# Patient Record
Sex: FEMALE | Race: WHITE | NOT HISPANIC OR LATINO | URBAN - NONMETROPOLITAN AREA
[De-identification: names, ages, dates, MRNs, and addresses within clinical notes are randomized per-mention and may not be internally consistent; named-entity substitution may affect disease eponyms.]

---

## 2017-03-16 PROBLEM — H26.493: Noted: 2017-10-16

## 2017-03-16 PROBLEM — H52.4: Noted: 2017-10-16

## 2017-03-16 PROBLEM — H40.1131: Noted: 2017-03-16

## 2017-03-16 PROBLEM — H02.834: Noted: 2017-10-16

## 2017-03-16 PROBLEM — H02.831: Noted: 2017-10-16

## 2019-05-30 ENCOUNTER — IMPORTED ENCOUNTER (OUTPATIENT)
Dept: URBAN - NONMETROPOLITAN AREA CLINIC 1 | Facility: CLINIC | Age: 69
End: 2019-05-30

## 2019-05-30 PROCEDURE — 92014 COMPRE OPH EXAM EST PT 1/>: CPT

## 2019-05-30 PROCEDURE — 92083 EXTENDED VISUAL FIELD XM: CPT

## 2019-05-30 NOTE — PATIENT DISCUSSION
POAG OU- Discussed diagnosis in detail with patient today- OCT done today showed no NFL thinning OU stable from previous. -  VF done today:  OD reliable with borderline non-specific defects better than previous. OS reliable with borderlin inferior nasal step stable from previous. - PACH done in the past shows  - Gonio done in the past shows Grade IV with light pigment OU- IOP stable at 10 OD and 11 OS- Cup to Disc at 0.65 OU - Hx of iStent at time of cataracts in place on exam today. - Continue without medication drops at this time- Continue to monitor every 6 months no less. P/C IOL (LRI) OU with PCO - Discussed findings in detail w/ pt today- Both intraocular lenses are stable and in place- PCO noted OU but stable and no treatment needed at this time - MR done today per pt request new GLRx given. She is interested in purchasing sunglasses.  - Monitor yearly or PRN Dermatochalasis OU- Discussed findings in detail w/ pt- No superior field of vision complaint noted- Monitor PRN; Dr's Notes: patient had developed periorbital pigmentation secondary to xalatan useMR  5/30/19 AOA OUDFE  5/30/19OCT disc  11/26/18OptosVF 5/30/19GonioPACH 3/16/17

## 2019-12-05 ENCOUNTER — IMPORTED ENCOUNTER (OUTPATIENT)
Dept: URBAN - NONMETROPOLITAN AREA CLINIC 1 | Facility: CLINIC | Age: 69
End: 2019-12-05

## 2019-12-05 PROCEDURE — 92014 COMPRE OPH EXAM EST PT 1/>: CPT

## 2019-12-05 PROCEDURE — 92250 FUNDUS PHOTOGRAPHY W/I&R: CPT

## 2019-12-05 NOTE — PATIENT DISCUSSION
POAG OU- Discussed diagnosis in detail with patient today- OCT done previously showed no NFL thinning OU stable from previous. -  VF done previously:  OD reliable with borderline non-specific defects better than previous. OS reliable with borderlin inferior nasal step stable from previous. - PACH done in the past shows  - Gonio done in the past shows Grade IV with light pigment OU- Optos done today cupping stable. - IOP stable at 12 OD and 12 OS- Cup to Disc at 0.65 OU - Hx of iStent at time of cataracts in place on exam today. - Continue without medication drops at this time- Continue to monitor every 6 months no less. P/C IOL (LRI) OU with PCO - Discussed findings in detail w/ pt today- Both intraocular lenses are stable and in place- PCO noted OU but stable and no treatment needed at this time - MR done previously per pt request new GLRx given. She is interested in purchasing sunglasses.  - Monitor yearly or PRN Dermatochalasis OU- Discussed findings in detail w/ pt- No superior field of vision complaint noted- Monitor PRN RIO OU mild-  discussed findings w/ pt today-  recommended baby shampoo eyelid scrubs daily-  monitor PRN; Dr's Notes: patient had developed periorbital pigmentation secondary to xalatan useMR  5/30/19 AOA OUDFE  5/30/19OCT disc  11/26/18Optos 12/5/19VF 5/30/19Gonio - deferred by TK 12/5/19PACH 3/16/17

## 2020-06-22 ENCOUNTER — IMPORTED ENCOUNTER (OUTPATIENT)
Dept: URBAN - NONMETROPOLITAN AREA CLINIC 1 | Facility: CLINIC | Age: 70
End: 2020-06-22

## 2020-06-22 PROCEDURE — 92014 COMPRE OPH EXAM EST PT 1/>: CPT

## 2020-06-22 PROCEDURE — 92133 CPTRZD OPH DX IMG PST SGM ON: CPT

## 2020-06-22 PROCEDURE — 92083 EXTENDED VISUAL FIELD XM: CPT

## 2020-06-22 NOTE — PATIENT DISCUSSION
POAG OU- Discussed diagnosis in detail with patient today- OCT done today OD shows no NFL thinning and OS shows Borderline Superior NFL thinning -  VF done today OD shows Superior Nasal Defect and OS shows Inferior Arcuate slight progression. OS shows slight progression- PACH done previously shows  - Gonio done previously shows Grade IV with light pigment OU- Optos done previously cupping stable. - IOP stable 13 OU- Cup to Disc at 0.65 OU - Hx of iStent at time of cataracts in place on exam today.  Jose Latisha into great detail with patient about re-starting glaucoma drop patient would like to wait until next follow up appointment to see if there is changes in the re-peat tesing - Continue to monitor - RTC 6 months F/U with re-peat Vf and OCT P/C IOL (LRI) OU with PCO - Discussed findings in detail w/ pt today- Both intraocular lenses are stable and in place- PCO noted OU but stable and no treatment needed at this time - Continue to monitor Dermatochalasis OU- Discussed findings in detail w/ pt- No superior field of vision complaint noted- Continue to monitor RIO OU mild- Discussed diagnosis in detail with patient- Discussed signs and symptoms of progression- Recommend patient drinking plenty of water and starting Omega 3’s - Recommend Refresh or Systane  throughout the day- Consider Restasis or plugs in the future if no improvement- Continue to monitor; 's Notes: patient had developed periorbital pigmentation secondary to xalatan useMR  5/30/19 AOA OUDFE  5/30/19OCT disc 6/22/20Optos 12/5/19VF 6/22/20Gonio - deferred by TK 12/5/19PACH 3/16/17

## 2020-09-15 NOTE — PATIENT DISCUSSION
New Prescription: brimonidine (brimonidine): drops: 0.2% 1 drop once a day as directed into both eyes 09-

## 2020-09-15 NOTE — PATIENT DISCUSSION
New Prescription: timolol maleate (timolol maleate): drops: 0.5% 1 drop once a day as directed into both eyes 09-

## 2020-12-17 ENCOUNTER — IMPORTED ENCOUNTER (OUTPATIENT)
Dept: URBAN - NONMETROPOLITAN AREA CLINIC 1 | Facility: CLINIC | Age: 70
End: 2020-12-17

## 2020-12-17 PROCEDURE — 92083 EXTENDED VISUAL FIELD XM: CPT

## 2020-12-17 PROCEDURE — 92133 CPTRZD OPH DX IMG PST SGM ON: CPT

## 2020-12-17 PROCEDURE — 92014 COMPRE OPH EXAM EST PT 1/>: CPT

## 2020-12-17 NOTE — PATIENT DISCUSSION
POAG OU- Discussed diagnosis in detail with patient today- OCT done todayOD shows no NFL thinning and OS shows Borderline Inferior NFL thinning -  VF done today Od shows Fair field with Borderline Superior Arcuate and OS shows Good field with Enlarged Blind Spot - PACH done previously shows  - Gonio done previously shows Grade IV with light pigment OU- Optos done previously cupping stable. - IOP stable 11 OU- Cup to Disc at 0.65 OU - Hx of iStent at time of cataracts in place on exam today.  Sybil Magdaleno into great detail with patient about re-starting glaucoma drop patient would like to wait until next follow up appointment to see if there is changes in the re-peat tesing - Continue to monitor - RTC 6 months F/U with optos and CompleteP/C IOL (LRI) OU with PCO - Discussed findings in detail w/ pt today- Both intraocular lenses are stable and in place- PCO noted OU but stable and no treatment needed at this time - Continue to monitor Dermatochalasis OU- Discussed findings in detail w/ pt- No superior field of vision complaint noted- Continue to monitor RIO OU mild- Discussed diagnosis in detail with patient- Discussed signs and symptoms of progression- Recommend patient drinking plenty of water and starting Omega 3’s - Recommend Refresh or Systane  throughout the day- Consider Restasis or plugs in the future if no improvement- Continue to monitor; 's Notes: patient had developed periorbital pigmentation secondary to xalatan useMR  5/30/19 AOA OUDFE  5/30/19OCT disc 12/17/20Optos 12/5/19VF 12/17/20Gonio - deferred by TK 12/5/19PACH 3/16/17

## 2021-06-21 ENCOUNTER — IMPORTED ENCOUNTER (OUTPATIENT)
Dept: URBAN - NONMETROPOLITAN AREA CLINIC 1 | Facility: CLINIC | Age: 71
End: 2021-06-21

## 2021-06-21 PROBLEM — H40.1131: Noted: 2021-06-21

## 2021-06-21 PROBLEM — Z96.1: Noted: 2021-06-21

## 2021-06-21 PROBLEM — H26.493: Noted: 2021-06-21

## 2021-06-21 PROBLEM — H52.4: Noted: 2021-06-21

## 2021-06-21 PROBLEM — H02.834: Noted: 2021-06-21

## 2021-06-21 PROBLEM — H02.831: Noted: 2021-06-21

## 2021-06-21 PROCEDURE — 92015 DETERMINE REFRACTIVE STATE: CPT

## 2021-06-21 PROCEDURE — 92014 COMPRE OPH EXAM EST PT 1/>: CPT

## 2021-06-21 NOTE — PATIENT DISCUSSION
Myopia OS / Astigmatism OD / Hyperopia OD / Presbyopia OU - Discussed diagnosis in detail with patient- New Glasses RX given today- Continue to monitorPOAG OU- Discussed diagnosis in detail with patient today- OCT done  previously OD shows no NFL thinning and OS shows Borderline Inferior NFL thinning -  VF done previously OD shows Fair field with Borderline Superior Arcuate and OS shows Good field with Enlarged Blind Spot - PACH done previously shows  - Gonio done previously shows Grade IV with light pigment OU- Optos done previously cupping stable. - IOP OD 10 andOS 09- Cup to Disc at 0.65 OU - Hx of iStent at time of cataracts in place on exam today.  Radha Mulling into great detail with patient about re-starting glaucoma drop patient would like to wait until next follow up appointment to see if there is changes in the re-peat tesing - Continue to monitor - RTC 6 months F/U with VF and OCTP/C IOL (LRI) OU with PCO - Discussed findings in detail w/ pt today- PCO noted OU but stable and no treatment needed at this time - Continue to monitor Dermatochalasis OU- Discussed findings in detail w/ pt- No superior field of vision complaint noted- Continue to monitor RIO OU mild- Discussed diagnosis in detail with patient- Discussed signs and symptoms of progression- Recommend patient drinking plenty of water and starting Omega 3’s - Recommend Refresh or Systane  throughout the day- Consider Restasis or plugs in the future if no improvement- Continue to monitor; 's Notes: patient had developed periorbital pigmentation secondary to xalatan useMR  5/30/19 AOA OUDFE  5/30/19OCT disc 12/17/20Optos 12/5/19VF 12/17/20Gonio - deferred by TK 12/5/19PACH 3/16/17

## 2021-12-27 ENCOUNTER — IMPORTED ENCOUNTER (OUTPATIENT)
Dept: URBAN - NONMETROPOLITAN AREA CLINIC 1 | Facility: CLINIC | Age: 71
End: 2021-12-27

## 2021-12-27 PROBLEM — H40.1131: Noted: 2021-12-27

## 2021-12-27 PROBLEM — Z96.1: Noted: 2021-12-27

## 2021-12-27 PROBLEM — H26.493: Noted: 2021-12-27

## 2021-12-27 PROBLEM — H02.834: Noted: 2021-12-27

## 2021-12-27 PROBLEM — H02.831: Noted: 2021-12-27

## 2021-12-27 PROCEDURE — 92083 EXTENDED VISUAL FIELD XM: CPT

## 2021-12-27 PROCEDURE — 92133 CPTRZD OPH DX IMG PST SGM ON: CPT

## 2021-12-27 PROCEDURE — 99213 OFFICE O/P EST LOW 20 MIN: CPT

## 2021-12-27 NOTE — PATIENT DISCUSSION
Myopia OS / Astigmatism OD / Hyperopia OD / Presbyopia OU - Discussed diagnosis in detail with patient- Continue to monitorPOAG OU- Discussed diagnosis in detail with patient today- OCT done today OU shows No NFL thnning  -  VF done today OD shows Good field and Normal and OS shows Good field with [place] - PACH done previously shows  - Gonio done previously shows Grade IV with light pigment OU- Optos done previously cupping stable. - IOP OD 10 andOS 10- Cup to Disc at 0.65 OU - Hx of iStent at time of cataracts in place on exam today.  - Continue to monitor - RTC 6 months routine with DFE P/C IOL (LRI) OU with PCO - Discussed findings in detail w/ pt today- PCO noted OU but stable and no treatment needed at this time - Continue to monitor Dermatochalasis OU- Discussed findings in detail w/ pt- No superior field of vision complaint noted- Continue to monitor RIO OU mild- Discussed diagnosis in detail with patient- Discussed signs and symptoms of progression- Recommend patient drinking plenty of water and starting Omega 3’s - Recommend Refresh or Systane  throughout the day- Consider Restasis or plugs in the future if no improvement- Continue to monitor; Dr's Notes: patient had developed periorbital pigmentation secondary to xalatan useMR  5/30/19 AOA OUDFE  5/30/19OCT disc 12/27/21Optos 12/5/19VF 12/27/21Gonio - deferred by TK 12/5/19PACH 3/16/17

## 2022-04-16 ASSESSMENT — PACHYMETRY
OD_CT_UM: 541; ADJ: THIN
OS_CT_UM: 543; ADJ: THIN
OD_CT_UM: 541; ADJ: THIN
OS_CT_UM: 543; ADJ: THIN
OS_CT_UM: 543; ADJ: THIN
OD_CT_UM: 541; ADJ: THIN
OS_CT_UM: 543; ADJ: THIN
OS_CT_UM: 543; ADJ: THIN
OD_CT_UM: 541; ADJ: THIN
OS_CT_UM: 543; ADJ: THIN

## 2022-04-16 ASSESSMENT — VISUAL ACUITY
OS_CC: 20/20
OD_GLARE: 20/40-
OD_CC: 20/20-
OD_CC: 20/25+
OS_CC: 20/20-
OS_GLARE: 20/40
OS_CC: J1+
OD_CC: 20/22+
OD_CC: J1
OD_CC: 20/22
OD_SC: 20/20
OD_SC: 20/20-2
OS_SC: 20/25-2
OS_CC: 20/20-
OS_CC: 20/20
OS_SC: 20/20

## 2022-04-16 ASSESSMENT — TONOMETRY
OD_IOP_MMHG: 12
OS_IOP_MMHG: 09
OD_IOP_MMHG: 11
OS_IOP_MMHG: 10
OS_IOP_MMHG: 11
OS_IOP_MMHG: 12
OS_IOP_MMHG: 13
OS_IOP_MMHG: 11
OD_IOP_MMHG: 10
OD_IOP_MMHG: 10
OD_IOP_MMHG: 13
OD_IOP_MMHG: 10

## 2022-06-28 ENCOUNTER — COMPREHENSIVE EXAM (OUTPATIENT)
Dept: URBAN - NONMETROPOLITAN AREA CLINIC 1 | Facility: CLINIC | Age: 72
End: 2022-06-28

## 2022-06-28 DIAGNOSIS — H52.4: ICD-10-CM

## 2022-06-28 PROCEDURE — 92014 COMPRE OPH EXAM EST PT 1/>: CPT

## 2022-06-28 PROCEDURE — 92015 DETERMINE REFRACTIVE STATE: CPT

## 2022-06-28 ASSESSMENT — TONOMETRY
OD_IOP_MMHG: 11
OS_IOP_MMHG: 10

## 2022-06-28 ASSESSMENT — VISUAL ACUITY
OS_CC: 20/30-1
OD_CC: 20/30-1

## 2023-03-03 ENCOUNTER — APPOINTMENT (OUTPATIENT)
Dept: URBAN - METROPOLITAN AREA CLINIC 209 | Age: 73
Setting detail: DERMATOLOGY
End: 2023-03-03

## 2023-03-03 DIAGNOSIS — L82.1 OTHER SEBORRHEIC KERATOSIS: ICD-10-CM

## 2023-03-03 DIAGNOSIS — L81.4 OTHER MELANIN HYPERPIGMENTATION: ICD-10-CM

## 2023-03-03 DIAGNOSIS — L57.8 OTHER SKIN CHANGES DUE TO CHRONIC EXPOSURE TO NONIONIZING RADIATION: ICD-10-CM

## 2023-03-03 DIAGNOSIS — D22 MELANOCYTIC NEVI: ICD-10-CM

## 2023-03-03 DIAGNOSIS — D18.0 HEMANGIOMA: ICD-10-CM

## 2023-03-03 PROBLEM — D18.01 HEMANGIOMA OF SKIN AND SUBCUTANEOUS TISSUE: Status: ACTIVE | Noted: 2023-03-03

## 2023-03-03 PROBLEM — D22.5 MELANOCYTIC NEVI OF TRUNK: Status: ACTIVE | Noted: 2023-03-03

## 2023-03-03 PROCEDURE — 99203 OFFICE O/P NEW LOW 30 MIN: CPT

## 2023-03-03 PROCEDURE — OTHER MIPS QUALITY: OTHER

## 2023-03-03 PROCEDURE — OTHER COUNSELING: OTHER

## 2023-03-03 ASSESSMENT — LOCATION SIMPLE DESCRIPTION DERM
LOCATION SIMPLE: ABDOMEN
LOCATION SIMPLE: LOWER BACK
LOCATION SIMPLE: UPPER BACK
LOCATION SIMPLE: RIGHT CLAVICULAR SKIN

## 2023-03-03 ASSESSMENT — LOCATION DETAILED DESCRIPTION DERM
LOCATION DETAILED: INFERIOR THORACIC SPINE
LOCATION DETAILED: RIGHT CLAVICULAR SKIN
LOCATION DETAILED: SUPERIOR THORACIC SPINE
LOCATION DETAILED: SUPERIOR LUMBAR SPINE
LOCATION DETAILED: SUBXIPHOID

## 2023-03-03 ASSESSMENT — LOCATION ZONE DERM: LOCATION ZONE: TRUNK

## 2023-03-03 NOTE — PROCEDURE: MIPS QUALITY
Quality 110: Preventive Care And Screening: Influenza Immunization: Influenza Immunization Administered during Influenza season
Name And Contact Information For Health Care Proxy: Abilio Mock
Detail Level: Detailed
Quality 111:Pneumonia Vaccination Status For Older Adults: Pneumococcal vaccine (PPSV23) administered on or after patient’s 60th birthday and before the end of the measurement period
Quality 226: Preventive Care And Screening: Tobacco Use: Screening And Cessation Intervention: Tobacco Screening not Performed

## 2023-03-03 NOTE — HPI: FULL BODY SKIN EXAMINATION
What Is The Reason For Today's Visit?: Full Body Skin Examination
What Is The Reason For Today's Visit? (Being Monitored For X): the development of a new lesion
Additional History: Pt notice the spot after she had treatment in February

## 2023-07-14 ENCOUNTER — FOLLOW UP (OUTPATIENT)
Dept: URBAN - NONMETROPOLITAN AREA CLINIC 1 | Facility: CLINIC | Age: 73
End: 2023-07-14

## 2023-07-14 ENCOUNTER — APPOINTMENT (OUTPATIENT)
Dept: URBAN - METROPOLITAN AREA CLINIC 209 | Age: 73
Setting detail: DERMATOLOGY
End: 2023-07-14

## 2023-07-14 DIAGNOSIS — D485 NEOPLASM OF UNCERTAIN BEHAVIOR OF SKIN: ICD-10-CM

## 2023-07-14 DIAGNOSIS — H40.1131: ICD-10-CM

## 2023-07-14 PROBLEM — D48.5 NEOPLASM OF UNCERTAIN BEHAVIOR OF SKIN: Status: ACTIVE | Noted: 2023-07-14

## 2023-07-14 PROCEDURE — OTHER BIOPSY BY SHAVE METHOD: OTHER

## 2023-07-14 PROCEDURE — 92133 CPTRZD OPH DX IMG PST SGM ON: CPT

## 2023-07-14 PROCEDURE — 99214 OFFICE O/P EST MOD 30 MIN: CPT

## 2023-07-14 PROCEDURE — OTHER MIPS QUALITY: OTHER

## 2023-07-14 PROCEDURE — 11102 TANGNTL BX SKIN SINGLE LES: CPT

## 2023-07-14 PROCEDURE — OTHER COUNSELING: OTHER

## 2023-07-14 PROCEDURE — 92083 EXTENDED VISUAL FIELD XM: CPT

## 2023-07-14 ASSESSMENT — LOCATION SIMPLE DESCRIPTION DERM: LOCATION SIMPLE: LEFT FOREHEAD

## 2023-07-14 ASSESSMENT — VISUAL ACUITY
OD_CC: 20/30
OS_CC: 20/20-1

## 2023-07-14 ASSESSMENT — TONOMETRY
OD_IOP_MMHG: 11
OS_IOP_MMHG: 11

## 2023-07-14 ASSESSMENT — LOCATION ZONE DERM: LOCATION ZONE: FACE

## 2023-07-14 ASSESSMENT — LOCATION DETAILED DESCRIPTION DERM: LOCATION DETAILED: LEFT INFERIOR FOREHEAD

## 2023-07-14 NOTE — PROCEDURE: COUNSELING
Sunscreen Recommendations: Use a sun block with zinc or titanium as primary ingredient EVERY day to face and other sun sensitive areas. A physical sun block, containing zinc and titanium is best as it reflects sun off your skin.  Pure zinc and/ or titanium sun blocks are also excellent for patients with allergies or sensitivities to sunscreen.  Sun blocks should not be used to increase the time spent in sunlight.  \\nUse a sunscreen with zinc or titanium oxide EVERY day.  We recommend Elta MD and ISDIN products.  ISDIN Ageless or Actinica are zinc based but also have an antioxidant and DNA repair enzyme in their formulation which protects your skin on several levels to minimize DNA damage to your skin cells.\\nWear protective clothing, including a hat with a wide brim and long-sleeved shirt and pants during prolonged periods of sun exposure.   A tight weave fabric is best.  Special protective clothing is available through several companies: [employer]  - www.coolibar.com or Sol[employer]ra - www.solumbra.com. Sunsleeve is a great accessory to combat the sun while wearing short sleeve shirts.
Detail Level: Detailed
Nicotinamide Supplementation Recommendations: SunISDIN or Heliocare (Polypodium leucotomos) capsules are recommended to help the skin protect itself from the sun.  \\nSunISDIN: Take 1 capsule each morning with water or juice. \\nHeliocare Advanced (with nicotinamide):  Take 2 capsules daily.  Nicotinamide lowers risk of SCC by 24%.
normal...

## 2023-07-14 NOTE — HPI: SKIN LESIONS
Have Your Skin Lesions Been Treated?: not been treated
Is This A New Presentation, Or A Follow-Up?: Skin Lesion
Which Family Member (Optional)?: Brother

## 2023-10-02 ENCOUNTER — APPOINTMENT (OUTPATIENT)
Dept: URBAN - METROPOLITAN AREA CLINIC 209 | Age: 73
Setting detail: DERMATOLOGY
End: 2023-10-02

## 2023-10-02 DIAGNOSIS — L98.8 OTHER SPECIFIED DISORDERS OF THE SKIN AND SUBCUTANEOUS TISSUE: ICD-10-CM

## 2023-10-02 PROCEDURE — OTHER COUNSELING: OTHER

## 2023-10-02 PROCEDURE — 99212 OFFICE O/P EST SF 10 MIN: CPT

## 2023-10-02 PROCEDURE — OTHER MIPS QUALITY: OTHER

## 2023-10-02 ASSESSMENT — LOCATION DETAILED DESCRIPTION DERM: LOCATION DETAILED: LEFT INFERIOR FOREHEAD

## 2023-10-02 ASSESSMENT — LOCATION SIMPLE DESCRIPTION DERM: LOCATION SIMPLE: LEFT FOREHEAD

## 2023-10-02 ASSESSMENT — LOCATION ZONE DERM: LOCATION ZONE: FACE

## 2023-10-02 NOTE — PROCEDURE: MIPS QUALITY
Quality 110: Preventive Care And Screening: Influenza Immunization: Influenza Immunization Administered during Influenza season
Name And Contact Information For Health Care Proxy: Abilio Mock
Quality 47: Advance Care Plan: Advance Care Planning discussed and documented; advance care plan or surrogate decision maker documented in the medical record.
Quality 431: Preventive Care And Screening: Unhealthy Alcohol Use - Screening: Patient not identified as an unhealthy alcohol user when screened for unhealthy alcohol use using a systematic screening method
Detail Level: Detailed
Quality 130: Documentation Of Current Medications In The Medical Record: Current Medications Documented
Quality 111:Pneumonia Vaccination Status For Older Adults: Patient did not receive any pneumococcal conjugate or polysaccharide vaccine on or after their 60th birthday and before the end of the measurement period
Quality 226: Preventive Care And Screening: Tobacco Use: Screening And Cessation Intervention: Tobacco Screening not Performed

## 2023-10-02 NOTE — PROCEDURE: COUNSELING
Patient Specific Counseling (Will Not Stick From Patient To Patient): Continue to follow for change.  Bx 7/23
Detail Level: Detailed

## 2024-01-15 ENCOUNTER — ESTABLISHED PATIENT (OUTPATIENT)
Dept: URBAN - NONMETROPOLITAN AREA CLINIC 1 | Facility: CLINIC | Age: 74
End: 2024-01-15

## 2024-01-15 DIAGNOSIS — H40.1131: ICD-10-CM

## 2024-01-15 DIAGNOSIS — H52.4: ICD-10-CM

## 2024-01-15 PROCEDURE — 92014 COMPRE OPH EXAM EST PT 1/>: CPT

## 2024-01-15 PROCEDURE — 92015 DETERMINE REFRACTIVE STATE: CPT

## 2024-01-15 PROCEDURE — 92133 CPTRZD OPH DX IMG PST SGM ON: CPT

## 2024-01-15 ASSESSMENT — VISUAL ACUITY
OD_CC: 20/30-2
OU_CC: 20/25
OS_CC: 20/25

## 2024-01-15 ASSESSMENT — TONOMETRY
OD_IOP_MMHG: 11
OS_IOP_MMHG: 11

## 2025-01-16 ENCOUNTER — COMPREHENSIVE EXAM (OUTPATIENT)
Age: 75
End: 2025-01-16

## 2025-01-16 DIAGNOSIS — H52.4: ICD-10-CM

## 2025-01-16 DIAGNOSIS — H40.1131: ICD-10-CM

## 2025-01-16 PROCEDURE — 92133 CPTRZD OPH DX IMG PST SGM ON: CPT | Mod: NC

## 2025-01-16 PROCEDURE — 92015 DETERMINE REFRACTIVE STATE: CPT

## 2025-01-16 PROCEDURE — 92014 COMPRE OPH EXAM EST PT 1/>: CPT
